# Patient Record
Sex: MALE | NOT HISPANIC OR LATINO | Employment: FULL TIME | URBAN - METROPOLITAN AREA
[De-identification: names, ages, dates, MRNs, and addresses within clinical notes are randomized per-mention and may not be internally consistent; named-entity substitution may affect disease eponyms.]

---

## 2020-09-11 ENCOUNTER — HOSPITAL ENCOUNTER (EMERGENCY)
Facility: MEDICAL CENTER | Age: 30
End: 2020-09-12
Attending: EMERGENCY MEDICINE

## 2020-09-11 ENCOUNTER — APPOINTMENT (OUTPATIENT)
Dept: RADIOLOGY | Facility: MEDICAL CENTER | Age: 30
End: 2020-09-11
Attending: EMERGENCY MEDICINE

## 2020-09-11 DIAGNOSIS — N45.1 EPIDIDYMITIS: ICD-10-CM

## 2020-09-11 PROCEDURE — 99283 EMERGENCY DEPT VISIT LOW MDM: CPT

## 2020-09-11 PROCEDURE — 76870 US EXAM SCROTUM: CPT

## 2020-09-11 SDOH — HEALTH STABILITY: MENTAL HEALTH: HOW OFTEN DO YOU HAVE 6 OR MORE DRINKS ON ONE OCCASION?: WEEKLY

## 2020-09-11 SDOH — HEALTH STABILITY: MENTAL HEALTH: HOW MANY STANDARD DRINKS CONTAINING ALCOHOL DO YOU HAVE ON A TYPICAL DAY?: 1 OR 2

## 2020-09-11 SDOH — HEALTH STABILITY: MENTAL HEALTH: HOW OFTEN DO YOU HAVE A DRINK CONTAINING ALCOHOL?: 2-4 TIMES A MONTH

## 2020-09-12 VITALS
HEIGHT: 78 IN | HEART RATE: 80 BPM | TEMPERATURE: 98.8 F | OXYGEN SATURATION: 92 % | WEIGHT: 293.43 LBS | BODY MASS INDEX: 33.95 KG/M2 | DIASTOLIC BLOOD PRESSURE: 88 MMHG | RESPIRATION RATE: 16 BRPM | SYSTOLIC BLOOD PRESSURE: 153 MMHG

## 2020-09-12 RX ORDER — DOXYCYCLINE 100 MG/1
100 CAPSULE ORAL 2 TIMES DAILY
Qty: 20 CAP | Refills: 0 | Status: SHIPPED | OUTPATIENT
Start: 2020-09-12

## 2020-09-12 NOTE — ED NOTES
Re-evaluation done. Patient discharged with prescription and instruction. Verbalized understanding.

## 2020-09-12 NOTE — ED PROVIDER NOTES
"ED Provider Note    Scribed for Dr. Cipriano Madrid M.D. by Anabel Enriquez. 9/11/2020  9:41 PM    Primary care provider: None noted  Means of arrival: Walk-in  History obtained from: Patient  History limited by: None    CHIEF COMPLAINT  Chief Complaint   Patient presents with   • Groin Pain     for the last 2 days, felt lump in groin pain when voiding, right side       HPI  Kenny Coyle is a 29 y.o. male who presents to the Emergency Department for a painful lump on the right testicle that was noticed two days ago and has not resolved since onset. The patient reports that he was at his baseline earlier this week however about two days ago the patient reports that he felt a small, painful \"lump\" on the right side of his testicle when voiding. No exacerbating or alleviating factors were reported. The patient does not report taking any over the counter medications for their current symptoms. He denies any recent symptoms of fevers, chills, painful urination or increased urination. Reported past medical history includes hypertension. No major surgical history was reported. The patient does not report taking any daily medications. He has no known allergies to medications.     REVIEW OF SYSTEMS  Pertinent positives include painful lump on right testicle. Pertinent negatives include no  fevers, chills, painful urination or increased urination.   See HPI for further details.     PAST MEDICAL HISTORY  No major past medical history was reported.    SURGICAL HISTORY  patient denies any surgical history    SOCIAL HISTORY  Social History     Tobacco Use   • Smoking status: Never Smoker   • Smokeless tobacco: Never Used   Substance Use Topics   • Alcohol use: Yes     Frequency: 2-4 times a month     Drinks per session: 1 or 2     Binge frequency: Weekly   • Drug use: Not Currently      Social History     Substance and Sexual Activity   Drug Use Not Currently       FAMILY HISTORY  History reviewed. No pertinent family " "history.    CURRENT MEDICATIONS  The patient does not take any daily medications    ALLERGIES  No Known Allergies    PHYSICAL EXAM  VITAL SIGNS: /69   Pulse (!) 115   Temp 37.3 °C (99.1 °F) (Temporal)   Resp 16   Ht 1.981 m (6' 6\")   Wt (!) 133.1 kg (293 lb 6.9 oz)   SpO2 96%   BMI 33.91 kg/m²     Constitutional: Well developed, Well nourished, Mild distress, Non-toxic appearance.   HENT: Normocephalic, Atraumatic, Bilateral external ears normal, Oropharynx moist, No oral exudates.   Abdomen: Soft, No tenderness, No masses, No pulsatile masses.   Skin: Warm, Dry, No erythema, No rash.   :Fairly large mildly tender r epididymis on testicle  Neurologic: Awake, alert. Moves all extremities spontaneously.  Psychiatric: Affect normal, Judgment normal, Mood normal.     LABS  No results found for this or any previous visit..    RADIOLOGY  [id number]   Final Result         1.  Enlarged heterogeneous left epididymal head with slight increased Doppler flow, appearance suggests changes of epididymitis.        The radiologist's interpretation of all radiological studies have been reviewed by me.    COURSE & MEDICAL DECISION MAKING  Pertinent Labs & Imaging studies reviewed. (See chart for details)    9:41 PM - Patient seen and examined at bedside. Patient presents to the ED complaining of painful lump to his right testicle. Discussed plan of care with patient which includes performing an ultrasound of the scrotum for further evaluation of his symptoms. Patient is agreeable to the plan of care. Ordered US-scrotum to evaluate his symptoms. The differential diagnoses include but are not limited to: epididymitis, testicular torsion, hydrocele, varices    Decision Making:  Patient is presenting with clinical evidence of epididymitis on both testicles.  Ultrasound is showing evidence of epididymitis on the left side patient has having discomfort more on the right side.  Nonetheless I will treat him for " epididymitis I discussed this with him he states his does not think this is secondary to sexually transmitted disease we will treat him with a course of Vibramycin    FINAL IMPRESSION  1. Epididymitis          I, Anabel Enriquez (Scribe), am scribing for, and in the presence of, Cipriano Madrid M.D..    Electronically signed by: Anabel Enriquez (Scribe), 9/11/2020    ICipriano M.D. personally performed the services described in this documentation, as scribed by Anabel Enriquez in my presence, and it is both accurate and complete.    E    The note accurately reflects work and decisions made by me.  Cipriano Madrid M.D.  9/13/2020  6:14 PM

## 2020-09-12 NOTE — ED TRIAGE NOTES
Pt ambulated to triage with   Chief Complaint   Patient presents with   • Groin Pain     for the last 2 days, felt lump in groin pain when voiding, right side      Pt Informed regarding triage process and verbalized understanding to inform triage tech or RN for any changes in condition. Placed in lobby.